# Patient Record
Sex: MALE | Race: WHITE | NOT HISPANIC OR LATINO | Employment: FULL TIME | URBAN - METROPOLITAN AREA
[De-identification: names, ages, dates, MRNs, and addresses within clinical notes are randomized per-mention and may not be internally consistent; named-entity substitution may affect disease eponyms.]

---

## 2020-05-28 ENCOUNTER — OFFICE VISIT (OUTPATIENT)
Dept: URGENT CARE | Facility: CLINIC | Age: 40
End: 2020-05-28
Payer: COMMERCIAL

## 2020-05-28 VITALS
TEMPERATURE: 97.2 F | DIASTOLIC BLOOD PRESSURE: 84 MMHG | RESPIRATION RATE: 16 BRPM | HEART RATE: 84 BPM | WEIGHT: 210 LBS | OXYGEN SATURATION: 96 % | BODY MASS INDEX: 32.96 KG/M2 | HEIGHT: 67 IN | SYSTOLIC BLOOD PRESSURE: 142 MMHG

## 2020-05-28 DIAGNOSIS — K04.7 DENTAL INFECTION: Primary | ICD-10-CM

## 2020-05-28 PROCEDURE — 99203 OFFICE O/P NEW LOW 30 MIN: CPT | Performed by: PHYSICIAN ASSISTANT

## 2020-05-28 RX ORDER — ROPINIROLE 0.25 MG/1
TABLET, FILM COATED ORAL
COMMUNITY
Start: 2020-02-23

## 2020-05-28 RX ORDER — CLINDAMYCIN HYDROCHLORIDE 150 MG/1
150 CAPSULE ORAL EVERY 8 HOURS SCHEDULED
Qty: 21 CAPSULE | Refills: 0 | Status: SHIPPED | OUTPATIENT
Start: 2020-05-28 | End: 2020-06-04

## 2020-05-28 RX ORDER — APIXABAN 5 MG/1
TABLET, FILM COATED ORAL
COMMUNITY
Start: 2020-05-27 | End: 2021-05-20

## 2020-09-13 ENCOUNTER — HOSPITAL ENCOUNTER (EMERGENCY)
Facility: HOSPITAL | Age: 40
Discharge: HOME | End: 2020-09-13
Attending: EMERGENCY MEDICINE | Admitting: EMERGENCY MEDICINE
Payer: COMMERCIAL

## 2020-09-13 ENCOUNTER — APPOINTMENT (EMERGENCY)
Dept: RADIOLOGY | Facility: HOSPITAL | Age: 40
End: 2020-09-13
Payer: COMMERCIAL

## 2020-09-13 VITALS
RESPIRATION RATE: 6 BRPM | OXYGEN SATURATION: 95 % | DIASTOLIC BLOOD PRESSURE: 86 MMHG | HEIGHT: 67 IN | SYSTOLIC BLOOD PRESSURE: 142 MMHG | WEIGHT: 210 LBS | HEART RATE: 86 BPM | BODY MASS INDEX: 32.96 KG/M2 | TEMPERATURE: 98.1 F

## 2020-09-13 DIAGNOSIS — S93.409A ANKLE SPRAIN: Primary | ICD-10-CM

## 2020-09-13 PROCEDURE — 99282 EMERGENCY DEPT VISIT SF MDM: CPT | Performed by: PHYSICIAN ASSISTANT

## 2020-09-13 PROCEDURE — 73620 X-RAY EXAM OF FOOT: CPT

## 2020-09-13 PROCEDURE — 73610 X-RAY EXAM OF ANKLE: CPT

## 2020-09-13 PROCEDURE — 99283 EMERGENCY DEPT VISIT LOW MDM: CPT

## 2020-09-13 NOTE — Clinical Note
Jhoana Mandy was seen and treated in our emergency department on 9/13/2020  Diagnosis:     Rubén Mohawk  may return to work on return date  He may return on this date: 09/16/2020         If you have any questions or concerns, please don't hesitate to call        Sepideh Floyd PA-C    ______________________________           _______________          _______________  Hospital Representative                              Date                                Time

## 2020-09-13 NOTE — ED ATTENDING ATTESTATION
9/13/2020  ITopher MD, saw and evaluated the patient  I have discussed the patient with the resident/non-physician practitioner and agree with the resident's/non-physician practitioner's findings, Plan of Care, and MDM as documented in the resident's/non-physician practitioner's note, except where noted  All available labs and Radiology studies were reviewed  I was present for key portions of any procedure(s) performed by the resident/non-physician practitioner and I was immediately available to provide assistance  At this point I agree with the current assessment done in the Emergency Department  I have conducted an independent evaluation of this patient a history and physical is as follows:    ED Course     Stepped in a hole in a floor and hurt his right ankle this morning  On exam patient awake alert  No apparent distress  Right knee and lower leg are nontender palpation  Right ankle shows mild swelling and tenderness to the lateral aspect  Right foot is nontender palpation  Dorsalis pedis pulse 2 +      Critical Care Time  Procedures

## 2020-09-13 NOTE — ED PROVIDER NOTES
History  Chief Complaint   Patient presents with    Fall     through a floor and injured the foot to the right     The patient is 36year old male who presents emergency department today with right ankle pain  Patient states that while remodeling a local property some of floor has been removed and he accidentally stepped into a hole in overturned his right ankle  Patient is having pain and swelling over his right lateral ankle  Ankle Pain   Location:  Ankle  Injury: yes    Mechanism of injury: fall    Fall:     Fall occurred:  Walking    Impact surface:  Hard floor    Point of impact:  Unable to specify    Entrapped after fall: no    Ankle location:  R ankle  Pain details:     Quality:  Shooting    Radiates to:  Does not radiate    Severity:  Moderate    Onset quality:  Sudden    Timing:  Constant    Progression:  Worsening  Chronicity:  New  Dislocation: no    Foreign body present:  Unable to specify  Tetanus status:  Unknown  Prior injury to area:  No  Relieved by:  Nothing  Worsened by:  Bearing weight  Ineffective treatments:  None tried  Associated symptoms: no back pain, no decreased ROM, no fatigue, no fever, no itching, no muscle weakness, no neck pain, no numbness and no stiffness        Prior to Admission Medications   Prescriptions Last Dose Informant Patient Reported? Taking? ELIQUIS 5 MG   Yes No   rOPINIRole (REQUIP) 0 25 mg tablet   Yes No   Sig: TAKE 1 TABLET (0 25 MG TOTAL) BY MOUTH 3 (THREE) TIMES A DAY  Facility-Administered Medications: None       Past Medical History:   Diagnosis Date    Restless leg     Subclavian artery thrombosis ([facility] [address] )        Past Surgical History:   Procedure Laterality Date    THROMBECTOMY         History reviewed  No pertinent family history  I have reviewed and agree with the history as documented      E-Cigarette/Vaping    E-Cigarette Use Never User      E-Cigarette/Vaping Substances     Social History     Tobacco Use    Smoking status: Current Every Day Smoker    Smokeless tobacco: Never Used   Substance Use Topics    Alcohol use: Not Currently    Drug use: Not Currently       Review of Systems   Constitutional: Negative for chills, fatigue and fever  HENT: Negative for ear pain  Eyes: Negative for pain and visual disturbance  Respiratory: Negative for shortness of breath and stridor  Cardiovascular: Negative for chest pain and palpitations  Gastrointestinal: Negative for abdominal pain, nausea and vomiting  Genitourinary: Negative for dysuria and hematuria  Musculoskeletal: Negative for arthralgias, back pain, neck pain and stiffness  Skin: Negative for color change, itching and rash  Neurological: Negative for seizures and speech difficulty  Physical Exam  Physical Exam  Vitals signs and nursing note reviewed  Constitutional:       General: He is not in acute distress  Appearance: He is well-developed  HENT:      Head: Normocephalic and atraumatic  Mouth/Throat:      Mouth: Mucous membranes are moist    Eyes:      Pupils: Pupils are equal, round, and reactive to light  Neck:      Musculoskeletal: Normal range of motion  Cardiovascular:      Rate and Rhythm: Normal rate and regular rhythm  Heart sounds: No murmur  Pulmonary:      Effort: Pulmonary effort is normal  No respiratory distress  Breath sounds: Normal breath sounds  Abdominal:      General: Bowel sounds are normal       Palpations: Abdomen is soft  Tenderness: There is no abdominal tenderness  Musculoskeletal:      Right lower leg: No edema  Left lower leg: No edema  Skin:     General: Skin is warm and dry  Capillary Refill: Capillary refill takes less than 2 seconds  Neurological:      General: No focal deficit present  Mental Status: He is alert and oriented to person, place, and time     Psychiatric:         Mood and Affect: Mood normal          Behavior: Behavior normal          Vital Signs  ED Triage Vitals Temperature Pulse Respirations Blood Pressure SpO2   09/13/20 1417 09/13/20 1417 09/13/20 1417 09/13/20 1419 09/13/20 1417   98 1 °F (36 7 °C) 90 16 147/90 93 %      Temp src Heart Rate Source Patient Position - Orthostatic VS BP Location FiO2 (%)   -- -- -- -- --             Pain Score       09/13/20 1417       6           Vitals:    09/13/20 1417 09/13/20 1419   BP:  147/90   Pulse: 90          Visual Acuity  Visual Acuity      Most Recent Value   L Pupil Size (mm)  4   R Pupil Size (mm)  4          ED Medications  Medications - No data to display    Diagnostic Studies  Results Reviewed     None                 XR foot 2 views RIGHT    (Results Pending)   XR ankle 3+ views RIGHT    (Results Pending)              Procedures  Procedures         ED Course                           SBIRT 22yo+      Most Recent Value   SBIRT (22 yo +)   In order to provide better care to our patients, we are screening all of our patients for alcohol and drug use  Would it be okay to ask you these screening questions? Yes Filed at: 09/13/2020 1428   Initial Alcohol Screen: US AUDIT-C    1  How often do you have a drink containing alcohol?  0 Filed at: 09/13/2020 1428   2  How many drinks containing alcohol do you have on a typical day you are drinking? 0 Filed at: 09/13/2020 1428   3a  Male UNDER 65: How often do you have five or more drinks on one occasion? 0 Filed at: 09/13/2020 1428   3b  FEMALE Any Age, or MALE 65+: How often do you have 4 or more drinks on one occassion? 0 Filed at: 09/13/2020 1428   Audit-C Score  0 Filed at: 09/13/2020 1428   XIAO: How many times in the past year have you    Used an illegal drug or used a prescription medication for non-medical reasons?   Never Filed at: 09/13/2020 1428                  MDM  Number of Diagnoses or Management Options  Ankle sprain:      Amount and/or Complexity of Data Reviewed  Tests in the radiology section of CPT®: ordered and reviewed  Decide to obtain previous medical records or to obtain history from someone other than the patient: yes  Review and summarize past medical records: yes  Independent visualization of images, tracings, or specimens: yes    Risk of Complications, Morbidity, and/or Mortality  Presenting problems: low  Diagnostic procedures: low  Management options: low        Disposition  Final diagnoses: Ankle sprain     Time reflects when diagnosis was documented in both MDM as applicable and the Disposition within this note     Time User Action Codes Description Comment    9/13/2020  2:43 PM Alana Flatness Add [H88 302N] Ankle sprain       ED Disposition     ED Disposition Condition Date/Time Comment    Discharge Stable Sun Sep 13, 2020  2:43 PM Valarie Byrd  discharge to home/self care  Follow-up Information    None         Patient's Medications   Discharge Prescriptions    No medications on file     No discharge procedures on file      PDMP Review     None          ED Provider  Electronically Signed by           Virgilio Aguiar PA-C  09/13/20 9391

## 2021-05-17 ENCOUNTER — APPOINTMENT (EMERGENCY)
Dept: RADIOLOGY | Facility: HOSPITAL | Age: 41
End: 2021-05-17
Payer: COMMERCIAL

## 2021-05-17 ENCOUNTER — HOSPITAL ENCOUNTER (EMERGENCY)
Facility: HOSPITAL | Age: 41
Discharge: HOME | End: 2021-05-18
Attending: EMERGENCY MEDICINE | Admitting: EMERGENCY MEDICINE
Payer: COMMERCIAL

## 2021-05-17 VITALS
RESPIRATION RATE: 20 BRPM | OXYGEN SATURATION: 98 % | TEMPERATURE: 97 F | SYSTOLIC BLOOD PRESSURE: 140 MMHG | HEIGHT: 68 IN | DIASTOLIC BLOOD PRESSURE: 85 MMHG | BODY MASS INDEX: 29.1 KG/M2 | WEIGHT: 192.02 LBS | HEART RATE: 82 BPM

## 2021-05-17 DIAGNOSIS — M25.511 ACUTE PAIN OF RIGHT SHOULDER: Primary | ICD-10-CM

## 2021-05-17 PROCEDURE — 99284 EMERGENCY DEPT VISIT MOD MDM: CPT | Performed by: EMERGENCY MEDICINE

## 2021-05-17 PROCEDURE — 99283 EMERGENCY DEPT VISIT LOW MDM: CPT

## 2021-05-17 PROCEDURE — 73030 X-RAY EXAM OF SHOULDER: CPT

## 2021-05-17 RX ORDER — ASPIRIN 81 MG/1
81 TABLET, CHEWABLE ORAL DAILY
COMMUNITY

## 2021-05-18 NOTE — ED PROVIDER NOTES
History  Chief Complaint   Patient presents with    Shoulder Pain     Patient reports right shoulder pain x 2 weeks  States he "threw a bag of flour over his shouler two weeks ago" and has been having pain since  Patient is a 70-year-old male presenting to the emergency department today complaining of right shoulder pain that is been going on for the past 3 weeks, states that started after caring a 50 lb bag of flower on his right shoulder, for he thought the pain would be gone by now but it has continued, he has not yet been seen by PCP or orthopedics for this problem, denies any numbness or tingling of the extremity, no pain or injury elsewhere          Prior to Admission Medications   Prescriptions Last Dose Informant Patient Reported? Taking? ELIQUIS 5 MG Not Taking at Unknown time  Yes No   aspirin 81 mg chewable tablet 5/17/2021 at Unknown time  Yes Yes   Sig: Chew 81 mg daily   rOPINIRole (REQUIP) 0 25 mg tablet 5/17/2021 at Unknown time  Yes Yes   Sig: TAKE 1 TABLET (0 25 MG TOTAL) BY MOUTH 3 (THREE) TIMES A DAY  Facility-Administered Medications: None       Past Medical History:   Diagnosis Date    Restless leg     Subclavian artery thrombosis ([facility] [address] )        Past Surgical History:   Procedure Laterality Date    THROMBECTOMY         History reviewed  No pertinent family history  I have reviewed and agree with the history as documented  E-Cigarette/Vaping    E-Cigarette Use Never User      E-Cigarette/Vaping Substances     Social History     Tobacco Use    Smoking status: Current Every Day Smoker     Packs/day: 1 00     Types: Cigarettes    Smokeless tobacco: Never Used   Substance Use Topics    Alcohol use: Not Currently    Drug use: Not Currently       Review of Systems   Constitutional: Negative  HENT: Negative  Eyes: Negative  Respiratory: Negative  Cardiovascular: Negative  Gastrointestinal: Negative  Endocrine: Negative  Genitourinary: Negative  Musculoskeletal: Positive for arthralgias (Right shoulder pain)  Skin: Negative  Allergic/Immunologic: Negative  Neurological: Negative  Hematological: Negative  Psychiatric/Behavioral: Negative  Physical Exam  Physical Exam  Constitutional:       Appearance: He is well-developed  HENT:      Head: Normocephalic and atraumatic  Eyes:      Conjunctiva/sclera: Conjunctivae normal       Pupils: Pupils are equal, round, and reactive to light  Neck:      Musculoskeletal: Normal range of motion and neck supple  Cardiovascular:      Rate and Rhythm: Normal rate  Pulmonary:      Effort: Pulmonary effort is normal    Abdominal:      Palpations: Abdomen is soft  Musculoskeletal: Normal range of motion  Arms:       Comments: Tenderness to palpate in the soft tissue of the right shoulder anteriorly, pain with range of motion testing, distal sensation and motor is intact with 2+ radial pulses and brisk capillary refill   Skin:     General: Skin is warm and dry  Neurological:      Mental Status: He is alert and oriented to person, place, and time           Vital Signs  ED Triage Vitals [05/17/21 2333]   Temperature Pulse Respirations Blood Pressure SpO2   (!) 97 °F (36 1 °C) 82 20 140/85 98 %      Temp Source Heart Rate Source Patient Position - Orthostatic VS BP Location FiO2 (%)   Temporal Monitor Sitting Right arm --      Pain Score       6           Vitals:    05/17/21 2333   BP: 140/85   Pulse: 82   Patient Position - Orthostatic VS: Sitting               ED Medications  Medications - No data to display    Diagnostic Studies  Results Reviewed     None                 XR shoulder 2+ views RIGHT   ED Interpretation by Ezekiel Velasquez DO (05/18 0010)   No acute findings                        ED Course  ED Course as of May 18 0300   Tue May 18, 2021   0254 Imaging findings unremarkable and discussed with patient at bedside, likely shoulder strain/sprain, possible rotator cuff injury, patient provided with shoulder immobilizer as well as instructions for follow-up and referral for Orthopedics, advised to return if symptoms worsen or any additional concerns, patient acknowledges understanding and agreement with this plan                                              Disposition  Final diagnoses:   Acute pain of right shoulder     Time reflects when diagnosis was documented in both MDM as applicable and the Disposition within this note     Time User Action Codes Description Comment    5/18/2021 12:29 AM Daniela Zahra Lizama [[id number]] Acute pain of right shoulder       ED Disposition     ED Disposition Condition Date/Time Comment    Discharge Stable Tue May 18, 2021 12:28 AM Clearance Ghee  discharge to home/self care              Follow-up Information     Follow up With Specialties Details Why [address] [facility] Orthopedic Surgery In 1 week  [facility]  [phone]            Discharge Medication List as of 5/18/2021 12:30 AM      CONTINUE these medications which have NOT CHANGED    Details   aspirin 81 mg chewable tablet Chew 81 mg daily, Historical Med      rOPINIRole (REQUIP) 0 25 mg tablet TAKE 1 TABLET (0 25 MG TOTAL) BY MOUTH 3 (THREE) TIMES A DAY , Historical Med      ELIQUIS 5 MG Starting Wed 5/27/2020, Historical Med               PDMP Review     None          ED Provider  Electronically Signed by           Errol Law DO  05/18/21 0712

## 2021-05-20 VITALS
TEMPERATURE: 98 F | DIASTOLIC BLOOD PRESSURE: 66 MMHG | WEIGHT: 192 LBS | HEIGHT: 68 IN | SYSTOLIC BLOOD PRESSURE: 108 MMHG | HEART RATE: 83 BPM | BODY MASS INDEX: 29.1 KG/M2 | RESPIRATION RATE: 20 BRPM

## 2021-05-20 DIAGNOSIS — G89.29 CHRONIC RIGHT SHOULDER PAIN: ICD-10-CM

## 2021-05-20 DIAGNOSIS — M75.01 ADHESIVE CAPSULITIS OF RIGHT SHOULDER: Primary | ICD-10-CM

## 2021-05-20 DIAGNOSIS — M25.511 CHRONIC RIGHT SHOULDER PAIN: ICD-10-CM

## 2021-05-20 PROCEDURE — 99204 OFFICE O/P NEW MOD 45 MIN: CPT | Performed by: STUDENT IN AN ORGANIZED HEALTH CARE EDUCATION/TRAINING PROGRAM

## 2021-05-20 PROCEDURE — 20610 DRAIN/INJ JOINT/BURSA W/O US: CPT | Performed by: STUDENT IN AN ORGANIZED HEALTH CARE EDUCATION/TRAINING PROGRAM

## 2021-05-20 RX ORDER — LIDOCAINE HYDROCHLORIDE 10 MG/ML
4 INJECTION, SOLUTION INFILTRATION; PERINEURAL
Status: COMPLETED | OUTPATIENT
Start: 2021-05-20 | End: 2021-05-20

## 2021-05-20 RX ORDER — NAPROXEN 500 MG/1
500 TABLET ORAL 2 TIMES DAILY WITH MEALS
Qty: 60 TABLET | Refills: 0 | Status: SHIPPED | OUTPATIENT
Start: 2021-05-20 | End: 2021-05-27

## 2021-05-20 RX ORDER — TRIAMCINOLONE ACETONIDE 40 MG/ML
40 INJECTION, SUSPENSION INTRA-ARTICULAR; INTRAMUSCULAR
Status: COMPLETED | OUTPATIENT
Start: 2021-05-20 | End: 2021-05-20

## 2021-05-20 RX ADMIN — TRIAMCINOLONE ACETONIDE 40 MG: 40 INJECTION, SUSPENSION INTRA-ARTICULAR; INTRAMUSCULAR at 10:37

## 2021-05-20 RX ADMIN — LIDOCAINE HYDROCHLORIDE 4 ML: 10 INJECTION, SOLUTION INFILTRATION; PERINEURAL at 10:37

## 2021-05-20 NOTE — PROGRESS NOTES
1  Adhesive capsulitis of right shoulder  Ambulatory referral to Physical Therapy    naproxen (NAPROSYN) 500 mg tablet    Large joint arthrocentesis: R glenohumeral   2  Chronic right shoulder pain  Ambulatory referral to Orthopedic Surgery    Ambulatory referral to Physical Therapy    naproxen (NAPROSYN) 500 mg tablet    Large joint arthrocentesis: R glenohumeral     Orders Placed This Encounter   Procedures    Large joint arthrocentesis: R glenohumeral    Ambulatory referral to Physical Therapy        Imaging Studies (I personally reviewed images in PACS and report):    Prior imagin  X-ray right shoulder 2021:  No acute osseous abnormalities  There is mild osteoarthritic changes of the glenohumeral and acromioclavicular joints    IMPRESSION:  Chronic right shoulder pain  DDx: Adhesive capsulitis, partial rotator cuff tear, subacromial bursitis, glenohumeral/AC joint osteoarthritis flare    Risk factors: RHD, occupation that requires frequent heavy lifting for several hours a day    PLAN:  The patient has an examination consistent with adhesive capsulitis of the right shoulder  I have discussed with the patient the pathophysiology of this diagnosis and reviewed how the examination correlates with this diagnosis  Treatment options were discussed at length and after discussing these treatment options, the patient elected for and received a right glenohumeral injection of corticosteroid (as described in the procedure note) with a prescription for referral to physical therapy  I have additionally prescribed him naproxen 500 mg p o  b i d  in the interim to allow corticosteroid to take effect  We will reevaluate the patient in 6-8 weeks  If the symptoms fail to improve with this treatment the patient would be indicated for further imaging in the form of an MRI scan of the right shoulder  Patient declined need for work note  Return in about 6 weeks (around 2021)        CHIEF COMPLAINT:  Right shoulder pain    HPI:  Nazanin Gomez  is a right hand dominant 39 y o  male  who presents for       Visit 05/20/2021 :  Initial evaluation of right shoulder pain:  Patient reports this has been an ongoing issue for the past 2 months without precipitating injury  Patient can only recall that he was carrying 50 lb bags of flour over his right shoulder and progressively had worsening right shoulder pain over time  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 and he was not lifting any more than usual  Pain is located over the lateral and posterolateral aspects of her shoulder that can radiate to his lateral right neck  Denies pain radiates down to his hand  Describes the pain as burning/throbbing and and "feeling like it is   "  He reports clicking/popping of his right shoulder which is a chronic issue prior to his pain  Additionally he reports progressively decreased range of motion of his shoulder due to stiffness and pain  Initially, when pain was minimal he had full range of motion  He denies upper extremity numbness/ tingling, swelling, bruising, inadvertently dropping objects  He is unable to lie on right shoulder as it aggravates his pain  Treatments tried include heat, ice, Motrin, Tylenol all without relief  He does recall receiving a lateral shoulder injection approximately 3 weeks ago from his PCP, however I am a unable to find any clinical information from this injection  He has never seen formal PT for this issue  ER visit summary 05/17/2021:  Patient complaining of about a month of right shoulder pain in regards to lifting a 50 lb bag of flour over his right shoulder that progressively cause worsening pain in his right shoulder not improve over time  Radiographs were done as noted above  Review of Systems   Constitutional: Negative for chills, diaphoresis and fever  Respiratory: Negative for cough and shortness of breath      Gastrointestinal: Negative for abdominal pain, nausea and vomiting  Musculoskeletal:        As per HPI   Skin: Negative for rash  Neurological:        As per HPI         Medical, Surgical, Family, and Social History    Past Medical History:   Diagnosis Date    Clot     removal    Restless leg     Subclavian artery thrombosis (HCC)      Past Surgical History:   Procedure Laterality Date    THROMBECTOMY       Social History   Social History     Substance and Sexual Activity   Alcohol Use Not Currently     Social History     Substance and Sexual Activity   Drug Use Not Currently     Social History     Tobacco Use   Smoking Status Current Every Day Smoker    Packs/day: 1 00    Types: Cigarettes   Smokeless Tobacco Never Used     Family History   Problem Relation Age of Onset    Diabetes Mother     Stroke Father      Allergies   Allergen Reactions    Penicillins Other (See Comments)     Unknown reaction    Latex Rash    Other Rash          Physical Exam  /66   Pulse 83   Temp 98 °F (36 7 °C)   Resp 20   Ht 5' 8" (1 727 m)   Wt 87 1 kg (192 lb)   BMI 29 19 kg/m²     Constitutional:  see vital signs  Gen: well-developed, normocephalic/atraumatic, well-groomed  Eyes: No inflammation or discharge of conjunctiva or lids; sclera clear   Pharynx: no inflammation, lesion, or mass of lips  Neck: supple, no masses, non-distended  MSK: no inflammation, lesion, mass, or clubbing of nails and digits except for other than mentioned below  SKIN: no visible rashes or skin lesions  Pulmonary/Chest: Effort normal  No respiratory distress     NEURO: cranial nerves grossly intact  PSYCH:  Alert and oriented to person, place, and time; recent and remote memory intact; mood normal, no depression, anxiety, or agitation, judgment and insight good and intact     Ortho Exam  Shoulder exam:       RIGHT    Inspection Erythema none     Swelling none     Increased Warmth none    Rotator cuff ER 5/5     IR 5-/5     Abduction 5/5    ROM FF 90 actively, 100 passively Aggravates pain    Abduction 90 actively, 100 passively Aggravates pain    ER0 30 (60 on left)     IRb Right buttock (T10 with left)    TTP:  +anterior glenohumeral joint line, + posterolateral greater tuberosity    Special Tests: O'Briens  (FF 90, add 10, resist thumbs up-, resist thumbs down+) unable to assess  slap    Cross body Adduction Negative     Speeds  Negative     Yergason's Negative     Debra's Positive     Neer Positive     Grover Positive        UE NV Exam: +2 Radial pulses   Sensation intact to light touch C5-T1 bilaterally,   Froment sign: intact  OK sign: intact  Thumb extension: 5/5    Right elbow, wrist, and and forearm ROM full, painless with passive ROM, no ttp or crepitance throughout extremities below shoulder joint    Cervical ROM is full without pain, numbness or tingling  Large joint arthrocentesis: R glenohumeral  [place] Protocol:  Consent: Verbal consent obtained  Risks and benefits: risks, benefits and alternatives were discussed  Consent given by: patient  Time out: Immediately prior to procedure a "time out" was called to verify the correct patient, procedure, equipment, support staff and site/side marked as required  Timeout called at: 5/20/2021 8:42 AM   Patient understanding: patient states understanding of the procedure being performed  Site marked: the operative site was marked  Radiology Images displayed and confirmed   If images not available, report reviewed: imaging studies available  Patient identity confirmed: verbally with patient    Supporting Documentation  Indications: pain   Procedure Details  Location: shoulder - R glenohumeral  Preparation: Patient was prepped and draped in the usual sterile fashion  Needle size: 22 G  Ultrasound guidance: no  Approach: posterolateral  Medications administered: 4 mL lidocaine 1 %; 40 mg triamcinolone acetonide 40 mg/mL    Patient tolerance: patient tolerated the procedure well with no immediate complications  Dressing:  Sterile dressing applied

## 2021-05-25 DIAGNOSIS — G89.29 CHRONIC RIGHT SHOULDER PAIN: ICD-10-CM

## 2021-05-25 DIAGNOSIS — M75.01 ADHESIVE CAPSULITIS OF RIGHT SHOULDER: ICD-10-CM

## 2021-05-25 DIAGNOSIS — M25.511 CHRONIC RIGHT SHOULDER PAIN: ICD-10-CM

## 2021-05-27 RX ORDER — NAPROXEN 500 MG/1
500 TABLET ORAL 2 TIMES DAILY WITH MEALS
Qty: 60 TABLET | Refills: 0 | Status: SHIPPED | OUTPATIENT
Start: 2021-05-27 | End: 2021-06-30

## 2021-06-30 DIAGNOSIS — M25.511 CHRONIC RIGHT SHOULDER PAIN: ICD-10-CM

## 2021-06-30 DIAGNOSIS — M75.01 ADHESIVE CAPSULITIS OF RIGHT SHOULDER: ICD-10-CM

## 2021-06-30 DIAGNOSIS — G89.29 CHRONIC RIGHT SHOULDER PAIN: ICD-10-CM

## 2021-06-30 RX ORDER — NAPROXEN 500 MG/1
500 TABLET ORAL 2 TIMES DAILY WITH MEALS
Qty: 60 TABLET | Refills: 0 | Status: SHIPPED | OUTPATIENT
Start: 2021-06-30 | End: 2021-08-18

## 2021-08-07 DIAGNOSIS — M75.01 ADHESIVE CAPSULITIS OF RIGHT SHOULDER: ICD-10-CM

## 2021-08-07 DIAGNOSIS — M25.511 CHRONIC RIGHT SHOULDER PAIN: ICD-10-CM

## 2021-08-07 DIAGNOSIS — G89.29 CHRONIC RIGHT SHOULDER PAIN: ICD-10-CM

## 2021-08-18 RX ORDER — NAPROXEN 500 MG/1
500 TABLET ORAL 2 TIMES DAILY WITH MEALS
Qty: 60 TABLET | Refills: 0 | Status: SHIPPED | OUTPATIENT
Start: 2021-08-18 | End: 2022-02-17

## 2021-09-13 ENCOUNTER — HOSPITAL ENCOUNTER (EMERGENCY)
Facility: HOSPITAL | Age: 41
Discharge: HOME | End: 2021-09-13
Attending: EMERGENCY MEDICINE | Admitting: EMERGENCY MEDICINE
Payer: COMMERCIAL

## 2021-09-13 VITALS
HEART RATE: 82 BPM | OXYGEN SATURATION: 98 % | HEIGHT: 68 IN | TEMPERATURE: 97.2 F | BODY MASS INDEX: 32.58 KG/M2 | DIASTOLIC BLOOD PRESSURE: 108 MMHG | RESPIRATION RATE: 19 BRPM | SYSTOLIC BLOOD PRESSURE: 170 MMHG | WEIGHT: 215 LBS

## 2021-09-13 DIAGNOSIS — M79.89 PAIN AND SWELLING OF LEFT LOWER LEG: Primary | ICD-10-CM

## 2021-09-13 DIAGNOSIS — M79.662 PAIN AND SWELLING OF LEFT LOWER LEG: Primary | ICD-10-CM

## 2021-09-13 DIAGNOSIS — I82.409 DVT OF LOWER LIMB, ACUTE (HCC): ICD-10-CM

## 2021-09-13 PROCEDURE — 99283 EMERGENCY DEPT VISIT LOW MDM: CPT

## 2021-09-13 PROCEDURE — 99284 EMERGENCY DEPT VISIT MOD MDM: CPT | Performed by: EMERGENCY MEDICINE

## 2021-09-14 ENCOUNTER — HOSPITAL ENCOUNTER (OUTPATIENT)
Dept: NON INVASIVE DIAGNOSTICS | Facility: HOSPITAL | Age: 41
Discharge: HOME | End: 2021-09-14
Payer: COMMERCIAL

## 2021-09-14 DIAGNOSIS — M79.89 PAIN AND SWELLING OF LEFT LOWER LEG: ICD-10-CM

## 2021-09-14 DIAGNOSIS — M79.662 PAIN AND SWELLING OF LEFT LOWER LEG: ICD-10-CM

## 2021-09-14 PROCEDURE — 93971 EXTREMITY STUDY: CPT | Performed by: SURGERY

## 2021-09-14 PROCEDURE — 93971 EXTREMITY STUDY: CPT

## 2021-09-14 NOTE — ED PROVIDER NOTES
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 placement 3 years ago  Was on eliquis, however has been off of it for the past 6 months, only taking 81mg ASA  Patient is a 59-year-old male with history of DVT with [place] filter was on Eliquis in the past however has been off it for the past 6 months following Layo filter placement presents emergency department complaining of left calf pain and swelling for the past 2 days  No other symptoms  No chest pain or shortness of breath  No injury occurred  Leg Pain  Location:  Leg  Time since incident:  2 days  Injury: no    Leg location:  L lower leg  Pain details:     Quality:  Pressure    Severity:  Mild    Onset quality:  Gradual    Duration:  2 days    Timing:  Constant    Progression:  Worsening  Associated symptoms: no fatigue and no fever        Prior to Admission Medications   Prescriptions Last Dose Informant Patient Reported? Taking?   aspirin 81 mg chewable tablet   Yes Yes   Sig: Chew 81 mg daily   naproxen (NAPROSYN) 500 mg tablet   No Yes   Sig: TAKE 1 TABLET (500 MG TOTAL) BY MOUTH 2 (TWO) TIMES A DAY WITH MEALS   rOPINIRole (REQUIP) 0 25 mg tablet   Yes Yes   Sig: TAKE 1 TABLET (0 25 MG TOTAL) BY MOUTH 3 (THREE) TIMES A DAY  Facility-Administered Medications: None       Past Medical History:   Diagnosis Date    Clot     removal    Restless leg     Subclavian artery thrombosis (HCC)        Past Surgical History:   Procedure Laterality Date    THROMBECTOMY         Family History   Problem Relation Age of Onset    Diabetes Mother     Stroke Father      I have reviewed and agree with the history as documented      E-Cigarette/Vaping    E-Cigarette Use Never User      E-Cigarette/Vaping Substances     Social History     Tobacco Use    Smoking status: Current Every Day Smoker     Packs/day: 1 00     Types: Cigarettes    Smokeless tobacco: Never Used   Vaping Use    Vaping Use: Never used   Substance Use Topics    Alcohol use: Not Currently    Drug use: Not Currently       Review of Systems   Constitutional: Negative for activity change, appetite change, chills, fatigue and fever  HENT: Negative for congestion, ear pain, rhinorrhea and sore throat  Eyes: Negative for discharge, redness and visual disturbance  Respiratory: Negative for cough, chest tightness, shortness of breath and wheezing  Cardiovascular: Negative for chest pain and palpitations  Gastrointestinal: Negative for abdominal pain, constipation, diarrhea, nausea and vomiting  Endocrine: Negative for polydipsia and polyuria  Genitourinary: Negative for difficulty urinating, dysuria, frequency, hematuria and urgency  Musculoskeletal: Negative for arthralgias and myalgias  Left leg pain and swelling   Skin: Negative for color change, pallor and rash  Neurological: Negative for dizziness, weakness, light-headedness, numbness and headaches  Hematological: Negative for adenopathy  Does not bruise/bleed easily  All other systems reviewed and are negative  Physical Exam  Physical Exam  Vitals and nursing note reviewed  Constitutional:       Appearance: He is well-developed  HENT:      Head: Normocephalic and atraumatic  Right Ear: External ear normal       Left Ear: External ear normal       Nose: Nose normal    Eyes:      Conjunctiva/sclera: Conjunctivae normal       Pupils: Pupils are equal, round, and reactive to light  Cardiovascular:      Rate and Rhythm: Normal rate and regular rhythm  Heart sounds: Normal heart sounds  Pulmonary:      Effort: Pulmonary effort is normal  No respiratory distress  Breath sounds: Normal breath sounds  No wheezing or rales  Chest:      Chest wall: No tenderness  Abdominal:      General: Bowel sounds are normal  There is no distension        Palpations: Abdomen is soft       Tenderness: There is no abdominal tenderness  There is no guarding  Musculoskeletal:         General: Normal range of motion  Cervical back: Normal range of motion and neck supple  Left lower leg: Swelling and tenderness present  Edema present  Skin:     General: Skin is warm and dry  Neurological:      Mental Status: He is alert and oriented to person, place, and time  Cranial Nerves: No cranial nerve deficit  Sensory: No sensory deficit  Vital Signs  ED Triage Vitals [09/13/21 2155]   Temperature Pulse Respirations Blood Pressure SpO2   (!) 97 2 °F (36 2 °C) 82 19 (!) 170/108 98 %      Temp Source Heart Rate Source Patient Position - Orthostatic VS BP Location FiO2 (%)   Temporal Monitor Lying Right arm --      Pain Score       6           Vitals:    09/13/21 2155   BP: (!) 170/108   Pulse: 82   Patient Position - Orthostatic VS: Lying         Visual Acuity      ED Medications  Medications - No data to display    Diagnostic Studies  Results Reviewed     None                 VAS lower limb venous duplex study, unilateral/limited    (Results Pending)              Procedures  Procedures         ED Course                             SBIRT 22yo+      Most Recent Value   SBIRT (22 yo +)   In order to provide better care to our patients, we are screening all of our patients for alcohol and drug use  Would it be okay to ask you these screening questions? Yes Filed at: 09/13/2021 2204   Initial Alcohol Screen: US AUDIT-C    1  How often do you have a drink containing alcohol?  0 Filed at: 09/13/2021 2204   2  How many drinks containing alcohol do you have on a typical day you are drinking? 0 Filed at: 09/13/2021 2204   3a  Male UNDER 65: How often do you have five or more drinks on one occasion? 0 Filed at: 09/13/2021 2204   3b  FEMALE Any Age, or MALE 65+: How often do you have 4 or more drinks on one occassion?   0 Filed at: 09/13/2021 2204   Audit-C Score  0 Filed at: 09/13/2021 2204   XIAO: How many times in the past year have you    Used an illegal drug or used a prescription medication for non-medical reasons? Never Filed at: 09/13/2021 2204                    [facility]  Number of Diagnoses or Management Options  DVT of lower limb, acute ([facility] [address] ): new and requires workup  Pain and swelling of left lower leg: new and requires workup  Diagnosis management comments: Patient is clinically and hemodynamically stable in the emergency department is no clinical symptoms of pulmonary embolism the patient does have a Lay[place] filter in place  Patient requiring venous duplex to rule out DVT this is not available at the current time of presentation patient provided with slip for outpatient duplex per protocol and advised to return tomorrow during vascular hours to have venous duplex performed to rule out DVT in the left leg advised to follow up promptly with PCP on results for this and for further evaluation and treatment patient understands instructions and agrees will follow up as advised return precautions and anticipatory guidance discussed           Amount and/or Complexity of Data Reviewed  Tests in the radiology section of CPT®: ordered  Decide to obtain previous medical records or to obtain history from someone other than the patient: yes  Review and summarize past medical records: yes    Risk of Complications, Morbidity, and/or Mortality  Presenting problems: low  Diagnostic procedures: low  Management options: low    Patient Progress  Patient progress: stable      Disposition  Final diagnoses:   Pain and swelling of left lower leg   [facility] ) - Possible rule out     Time reflects when diagnosis was documented in both MDM as applicable and the Disposition within this note     Time User Action Codes Description Comment    9/13/2021 10:02 PM Venancio Palacios Add [W42 224,  X62 95] Pain and swelling of left lower leg     9/13/2021 10:02 PM Venancio Palacios Add [I82 409] DVT of lower limb, acute ([facility] [address] )     9/13/2021 10:02 PM Alicja Gates Modify [I82 409] DVT of lower limb, acute ([facility] ) Possible rule out      ED Disposition     ED Disposition Condition Date/Time Comment    Discharge Stable Mon Sep 13, 2021 10:01 PM Jerica Pappas  discharge to home/self care  Follow-up Information     Follow up With Specialties Details Why Contact Info    Manuela Rivero MD  Schedule an appointment as soon as possible for a visit in 2 days  [id number] [facility]  [phone]            Discharge Medication List as of 9/13/2021 10:03 PM      CONTINUE these medications which have NOT CHANGED    Details   aspirin 81 mg chewable tablet Chew 81 mg daily, Historical Med      naproxen (NAPROSYN) 500 mg tablet TAKE 1 TABLET (500 MG TOTAL) BY MOUTH 2 (TWO) TIMES A DAY WITH MEALS, Starting Wed 8/18/2021, Normal      rOPINIRole (REQUIP) 0 25 mg tablet TAKE 1 TABLET (0 25 MG TOTAL) BY MOUTH 3 (THREE) TIMES A DAY , Historical Med           Outpatient Discharge Orders   VAS lower limb venous duplex study, unilateral/limited   Standing Status: Future Standing Exp   Date: 09/13/25       PDMP Review     None          ED Provider  Electronically Signed by           Filomena Bojorquez DO  09/14/21 5736

## 2021-10-18 VITALS
TEMPERATURE: 98.7 F | WEIGHT: 220 LBS | DIASTOLIC BLOOD PRESSURE: 88 MMHG | HEIGHT: 68 IN | HEART RATE: 77 BPM | BODY MASS INDEX: 33.34 KG/M2 | SYSTOLIC BLOOD PRESSURE: 150 MMHG

## 2021-10-18 DIAGNOSIS — M25.511 CHRONIC RIGHT SHOULDER PAIN: Primary | ICD-10-CM

## 2021-10-18 DIAGNOSIS — M75.01 ADHESIVE CAPSULITIS OF RIGHT SHOULDER: ICD-10-CM

## 2021-10-18 DIAGNOSIS — M19.011 GLENOHUMERAL ARTHRITIS, RIGHT: ICD-10-CM

## 2021-10-18 DIAGNOSIS — G89.29 CHRONIC RIGHT SHOULDER PAIN: Primary | ICD-10-CM

## 2021-10-18 PROCEDURE — 99213 OFFICE O/P EST LOW 20 MIN: CPT | Performed by: STUDENT IN AN ORGANIZED HEALTH CARE EDUCATION/TRAINING PROGRAM

## 2021-10-18 PROCEDURE — 20610 DRAIN/INJ JOINT/BURSA W/O US: CPT | Performed by: STUDENT IN AN ORGANIZED HEALTH CARE EDUCATION/TRAINING PROGRAM

## 2021-10-18 RX ORDER — PREDNISONE 10 MG/1
10 TABLET ORAL DAILY
COMMUNITY
Start: 2021-10-15

## 2021-10-18 RX ORDER — GABAPENTIN 300 MG/1
CAPSULE ORAL
COMMUNITY
Start: 2021-10-06

## 2021-10-18 RX ORDER — MELOXICAM 15 MG/1
TABLET ORAL
COMMUNITY
Start: 2021-10-13 | End: 2022-02-17

## 2021-10-18 RX ORDER — NICOTINE 21 MG/24HR
PATCH, TRANSDERMAL 24 HOURS TRANSDERMAL
COMMUNITY
Start: 2021-08-25

## 2021-10-19 RX ORDER — TRIAMCINOLONE ACETONIDE 40 MG/ML
40 INJECTION, SUSPENSION INTRA-ARTICULAR; INTRAMUSCULAR
Status: COMPLETED | OUTPATIENT
Start: 2021-10-19 | End: 2021-10-19

## 2021-10-19 RX ORDER — LIDOCAINE HYDROCHLORIDE 20 MG/ML
4 INJECTION, SOLUTION INFILTRATION; PERINEURAL
Status: COMPLETED | OUTPATIENT
Start: 2021-10-19 | End: 2021-10-19

## 2021-10-19 RX ADMIN — TRIAMCINOLONE ACETONIDE 40 MG: 40 INJECTION, SUSPENSION INTRA-ARTICULAR; INTRAMUSCULAR at 06:41

## 2021-10-19 RX ADMIN — LIDOCAINE HYDROCHLORIDE 4 ML: 20 INJECTION, SOLUTION INFILTRATION; PERINEURAL at 06:41

## 2021-11-08 VITALS
HEART RATE: 74 BPM | HEIGHT: 68 IN | BODY MASS INDEX: 31.98 KG/M2 | WEIGHT: 211 LBS | SYSTOLIC BLOOD PRESSURE: 140 MMHG | DIASTOLIC BLOOD PRESSURE: 82 MMHG

## 2021-11-08 DIAGNOSIS — G89.29 CHRONIC RIGHT SHOULDER PAIN: Primary | ICD-10-CM

## 2021-11-08 DIAGNOSIS — M25.511 CHRONIC RIGHT SHOULDER PAIN: Primary | ICD-10-CM

## 2021-11-08 DIAGNOSIS — M75.01 ADHESIVE CAPSULITIS OF RIGHT SHOULDER: ICD-10-CM

## 2021-11-08 PROCEDURE — 99213 OFFICE O/P EST LOW 20 MIN: CPT | Performed by: STUDENT IN AN ORGANIZED HEALTH CARE EDUCATION/TRAINING PROGRAM

## 2022-02-14 DIAGNOSIS — G89.29 CHRONIC RIGHT SHOULDER PAIN: ICD-10-CM

## 2022-02-14 DIAGNOSIS — M75.01 ADHESIVE CAPSULITIS OF RIGHT SHOULDER: ICD-10-CM

## 2022-02-14 DIAGNOSIS — M25.511 CHRONIC RIGHT SHOULDER PAIN: ICD-10-CM

## 2022-02-17 RX ORDER — NAPROXEN 500 MG/1
TABLET ORAL
Qty: 60 TABLET | Refills: 0 | Status: SHIPPED | OUTPATIENT
Start: 2022-02-17 | End: 2022-03-21

## 2022-03-21 DIAGNOSIS — G89.29 CHRONIC RIGHT SHOULDER PAIN: ICD-10-CM

## 2022-03-21 DIAGNOSIS — M25.511 CHRONIC RIGHT SHOULDER PAIN: ICD-10-CM

## 2022-03-21 DIAGNOSIS — M75.01 ADHESIVE CAPSULITIS OF RIGHT SHOULDER: ICD-10-CM

## 2022-03-21 RX ORDER — NAPROXEN 500 MG/1
TABLET ORAL
Qty: 60 TABLET | Refills: 0 | Status: SHIPPED | OUTPATIENT
Start: 2022-03-21